# Patient Record
Sex: MALE | Race: WHITE
[De-identification: names, ages, dates, MRNs, and addresses within clinical notes are randomized per-mention and may not be internally consistent; named-entity substitution may affect disease eponyms.]

---

## 2023-05-24 ENCOUNTER — RX ONLY (OUTPATIENT)
Age: 38
Setting detail: RX ONLY
End: 2023-05-24

## 2023-07-11 ENCOUNTER — APPOINTMENT (RX ONLY)
Dept: URBAN - METROPOLITAN AREA CLINIC 33 | Facility: CLINIC | Age: 38
Setting detail: DERMATOLOGY
End: 2023-07-11

## 2023-07-11 DIAGNOSIS — L71.8 OTHER ROSACEA: ICD-10-CM

## 2023-07-11 PROCEDURE — ? PRESCRIPTION

## 2023-07-11 PROCEDURE — 99203 OFFICE O/P NEW LOW 30 MIN: CPT

## 2023-07-11 PROCEDURE — ? COUNSELING

## 2023-07-11 RX ORDER — DOXYCYCLINE 50 MG/1
1 CAPSULE ORAL DAILY
Qty: 90 | Refills: 0 | Status: ERX | COMMUNITY
Start: 2023-07-11

## 2023-07-11 RX ORDER — OXYMETAZOLINE HYDROCHLORIDE 1 G/100G
APPLY 1 GRAM CREAM TOPICAL
Qty: 30 | Refills: 1 | Status: ERX | COMMUNITY
Start: 2023-07-11

## 2023-07-11 RX ADMIN — DOXYCYCLINE 1: 50 CAPSULE ORAL at 00:00

## 2023-07-11 RX ADMIN — OXYMETAZOLINE HYDROCHLORIDE APPLY 1 GRAM: 1 CREAM TOPICAL at 00:00

## 2023-07-11 ASSESSMENT — LOCATION DETAILED DESCRIPTION DERM
LOCATION DETAILED: LEFT SUPERIOR CENTRAL MALAR CHEEK
LOCATION DETAILED: RIGHT FOREHEAD
LOCATION DETAILED: LEFT FOREHEAD
LOCATION DETAILED: RIGHT SUPERIOR CENTRAL MALAR CHEEK
LOCATION DETAILED: RIGHT CHIN
LOCATION DETAILED: NASAL DORSUM

## 2023-07-11 ASSESSMENT — LOCATION SIMPLE DESCRIPTION DERM
LOCATION SIMPLE: RIGHT FOREHEAD
LOCATION SIMPLE: NOSE
LOCATION SIMPLE: CHIN
LOCATION SIMPLE: LEFT FOREHEAD
LOCATION SIMPLE: RIGHT CHEEK
LOCATION SIMPLE: LEFT CHEEK

## 2023-07-11 ASSESSMENT — LOCATION ZONE DERM
LOCATION ZONE: FACE
LOCATION ZONE: NOSE

## 2023-07-11 NOTE — PROCEDURE: COUNSELING
Sunscreen Recommendations: It was recommend that sunscreens be applied on a daily basis. This should be applied an hour before exposure and then every two hours if out for an extended period of time. SPF 30 or greater is recommended. There are mineral sunscreens and chemical sun screens. The mineral sunscreens sometimes are difficult to use due to the white color. Top rated sunscreen include La Roche Posay, Blue Lizard, Elta MD and Neutrogena Ultra Dry.
Moisturizer Recommendations: A mild cleanser is recommend such a CeraVe or Cetaphil.  If you become dry you can apply a moisturizer.
Sunscreen Recommendation Label Override: SPF 30
Patient Specific Counseling (Will Not Stick From Patient To Patient): **He is going to start on doxycycline for the next 3 months and then transition to metronidazole. Due to the significant erythema I suggested  starting on Rhofade. I sent a prescription to [facility]. This will be formulated. He will pay cash for the product. This will be applied daily.
Detail Level: Simple

## 2023-07-11 NOTE — HPI: ACNE (PATIENT REPORTED)
Where Is Your Acne Located?: Face
List Over The Counter Products You Tried (Separate Each Name With A Comma):: Astringent
Additional Comments (Use Complete Sentences): Patient is being referred by his PCP to start Accutane.

## 2023-10-17 ENCOUNTER — APPOINTMENT (RX ONLY)
Dept: URBAN - METROPOLITAN AREA CLINIC 33 | Facility: CLINIC | Age: 38
Setting detail: DERMATOLOGY
End: 2023-10-17

## 2023-10-17 DIAGNOSIS — L71.8 OTHER ROSACEA: ICD-10-CM | Status: INADEQUATELY CONTROLLED

## 2023-10-17 PROCEDURE — ? PRESCRIPTION

## 2023-10-17 PROCEDURE — 99214 OFFICE O/P EST MOD 30 MIN: CPT

## 2023-10-17 PROCEDURE — ? COUNSELING

## 2023-10-17 RX ORDER — OXYMETAZOLINE HYDROCHLORIDE 1 G/100G
APPLY 1 GRAM CREAM TOPICAL
Qty: 30 | Refills: 1 | Status: CANCELLED

## 2023-10-17 RX ORDER — IVERMECTIN 10 MG/G
0.5 GRAM TO FACE CREAM TOPICAL NIGHTLY
Qty: 45 | Refills: 0 | Status: ERX | COMMUNITY
Start: 2023-10-17

## 2023-10-17 RX ORDER — METRONIDAZOLE 250 MG/1
1 TABLET TABLET ORAL BID
Qty: 60 | Refills: 2 | Status: ERX | COMMUNITY
Start: 2023-10-17

## 2023-10-17 RX ADMIN — IVERMECTIN 0.5 GRAM TO FACE: 10 CREAM TOPICAL at 00:00

## 2023-10-17 RX ADMIN — METRONIDAZOLE 1 TABLET: 250 TABLET ORAL at 00:00

## 2023-10-17 ASSESSMENT — LOCATION DETAILED DESCRIPTION DERM
LOCATION DETAILED: LEFT FOREHEAD
LOCATION DETAILED: MID-FRONTAL SCALP
LOCATION DETAILED: RIGHT CHIN
LOCATION DETAILED: LEFT SUPERIOR CENTRAL MALAR CHEEK
LOCATION DETAILED: RIGHT SUPERIOR CENTRAL MALAR CHEEK
LOCATION DETAILED: NASAL DORSUM
LOCATION DETAILED: RIGHT FOREHEAD

## 2023-10-17 ASSESSMENT — LOCATION SIMPLE DESCRIPTION DERM
LOCATION SIMPLE: CHIN
LOCATION SIMPLE: ANTERIOR SCALP
LOCATION SIMPLE: NOSE
LOCATION SIMPLE: RIGHT FOREHEAD
LOCATION SIMPLE: LEFT CHEEK
LOCATION SIMPLE: LEFT FOREHEAD
LOCATION SIMPLE: RIGHT CHEEK

## 2023-10-17 ASSESSMENT — LOCATION ZONE DERM
LOCATION ZONE: FACE
LOCATION ZONE: NOSE
LOCATION ZONE: SCALP

## 2023-10-17 ASSESSMENT — SEVERITY ASSESSMENT OVERALL AMONG ALL PATIENTS
IN YOUR EXPERIENCE, AMONG ALL PATIENTS YOU HAVE SEEN WITH THIS CONDITION, HOW SEVERE IS THIS PATIENT'S CONDITION?: SEVERE

## 2023-10-17 NOTE — PROCEDURE: COUNSELING
Sunscreen Recommendations: It was recommend that sunscreens be applied on a daily basis. This should be applied an hour before exposure and then every two hours if out for an extended period of time. SPF 30 or greater is recommended. There are mineral sunscreens and chemical sun screens. The mineral sunscreens sometimes are difficult to use due to the white color. Top rated sunscreen include La Roche Posay, Blue Lizard, Elta MD and Neutrogena Ultra Dry.
Moisturizer Recommendations: A mild cleanser is recommend such a CeraVe or Cetaphil.  If you become dry you can apply a moisturizer.
Sunscreen Recommendation Label Override: SPF 30
Patient Specific Counseling (Will Not Stick From Patient To Patient): **He is still very inflamed. I suggested oral metronidazole. This is to be taken BID. Due to the significant erythema I suggested  continuing on Rhofade each morning and add in generic Soolantra to the face at night.
Detail Level: Simple

## 2023-10-19 ENCOUNTER — RX ONLY (OUTPATIENT)
Age: 38
Setting detail: RX ONLY
End: 2023-10-19

## 2023-10-19 RX ORDER — AZELAIC ACID 0.15 G/G
1 GARM TO THE FACE GEL TOPICAL BID
Qty: 50 | Refills: 0 | Status: ERX | COMMUNITY
Start: 2023-10-19